# Patient Record
Sex: MALE | Race: WHITE | NOT HISPANIC OR LATINO | Employment: FULL TIME | URBAN - METROPOLITAN AREA
[De-identification: names, ages, dates, MRNs, and addresses within clinical notes are randomized per-mention and may not be internally consistent; named-entity substitution may affect disease eponyms.]

---

## 2022-05-20 ENCOUNTER — OFFICE VISIT (OUTPATIENT)
Dept: NEUROSURGERY | Facility: CLINIC | Age: 57
End: 2022-05-20

## 2022-05-20 VITALS — TEMPERATURE: 98.2 F | WEIGHT: 235.6 LBS | HEIGHT: 71 IN | BODY MASS INDEX: 32.98 KG/M2

## 2022-05-20 DIAGNOSIS — M51.36 DDD (DEGENERATIVE DISC DISEASE), LUMBAR: ICD-10-CM

## 2022-05-20 DIAGNOSIS — M47.22 CERVICAL SPONDYLOSIS WITH RADICULOPATHY: Primary | ICD-10-CM

## 2022-05-20 DIAGNOSIS — G56.01 CARPAL TUNNEL SYNDROME OF RIGHT WRIST: ICD-10-CM

## 2022-05-20 PROCEDURE — 99203 OFFICE O/P NEW LOW 30 MIN: CPT | Performed by: NEUROLOGICAL SURGERY

## 2022-05-20 RX ORDER — CETIRIZINE HYDROCHLORIDE 10 MG/1
10 TABLET ORAL DAILY
COMMUNITY

## 2022-05-20 RX ORDER — SIMVASTATIN 10 MG
10 TABLET ORAL NIGHTLY
COMMUNITY

## 2022-05-20 RX ORDER — METHYLPREDNISOLONE 4 MG/1
TABLET ORAL
COMMUNITY
Start: 2022-05-12

## 2022-05-20 NOTE — PROGRESS NOTES
Patient: Kyle Guillermo  : 1965    Primary Care Provider: Schumer, Barry, MD    Requesting Provider: As above        History    Chief Complaint: Numbness in the right thumb and index finger with occasional discomfort of the right shoulder.    History of Present Illness: Mr. Guillermo is a 56-year-old gentleman who works at [employer].  He does a good deal of computer work.  Since around November of last year he has had some numbness in the right thumb and index finger.  He has known carpal tunnel syndrome and has used a wrist splint for a long time.  His symptoms had gone away but returned as above and so he reinstituted the wrist splint.  That has not helped very much.  Occasionally gets a twinge of discomfort in the right shoulder.  He has no neck or radicular pain.  Symptoms are more annoying than bothersome.  Historically his father used a home traction unit.    Review of Systems   Constitutional: Negative for activity change, appetite change, chills, diaphoresis, fatigue, fever and unexpected weight change.   HENT: Negative for congestion, dental problem, drooling, ear discharge, ear pain, facial swelling, hearing loss, mouth sores, nosebleeds, postnasal drip, rhinorrhea, sinus pressure, sinus pain, sneezing, sore throat, tinnitus, trouble swallowing and voice change.    Eyes: Negative for photophobia, pain, discharge, redness, itching and visual disturbance.   Respiratory: Negative for apnea, cough, choking, chest tightness, shortness of breath, wheezing and stridor.    Cardiovascular: Negative for chest pain, palpitations and leg swelling.   Gastrointestinal: Negative for abdominal distention, abdominal pain, anal bleeding, blood in stool, constipation, diarrhea, nausea, rectal pain and vomiting.   Endocrine: Negative for cold intolerance, heat intolerance, polydipsia, polyphagia and polyuria.   Genitourinary: Negative for decreased urine volume, difficulty urinating, dysuria, enuresis,  "flank pain, frequency, genital sores, hematuria, penile discharge, penile pain, penile swelling, scrotal swelling, testicular pain and urgency.   Musculoskeletal: Negative for arthralgias, back pain, gait problem, joint swelling, myalgias, neck pain and neck stiffness.   Skin: Negative for color change, pallor, rash and wound.   Allergic/Immunologic: Negative for environmental allergies, food allergies and immunocompromised state.   Neurological: Positive for numbness. Negative for dizziness, tremors, seizures, syncope, facial asymmetry, speech difficulty, weakness, light-headedness and headaches.   Hematological: Negative for adenopathy. Does not bruise/bleed easily.   Psychiatric/Behavioral: Negative for agitation, behavioral problems, confusion, decreased concentration, dysphoric mood, hallucinations, self-injury, sleep disturbance and suicidal ideas. The patient is not nervous/anxious and is not hyperactive.        The patient's past medical history, past surgical history, family history, and social history have been reviewed at length in the electronic medical record.      Physical Exam:   Temp 98.2 °F (36.8 °C)   Ht 180.3 cm (71\")   Wt 107 kg (235 lb 9.6 oz)   BMI 32.86 kg/m²   CONSTITUTIONAL: Patient is well-nourished, pleasant and appears stated age.  MUSCULOSKELETAL:  Straight leg raising is negative.  Carroll's Sign is negative.  ROM in the low back is normal.  Tenderness in the back to palpation is not observed.  Tinel's sign is positive at the left wrist but negative on the right.  There is no focal tenderness to palpation in the shoulder.  NEUROLOGICAL:  Orientation, memory, attention span, language function, and cognition have been examined and are intact.  Strength is intact in the lower extremities to direct testing.  Muscle tone is normal throughout.  Station and gait are normal.  Sensation is intact to light touch testing throughout.  Deep tendon reflexes are 1+ and symmetrical.  Coordination is " intact.      Medical Decision Making    Data Review:   (All imaging studies were personally reviewed unless stated otherwise)  MRI of the cervical spine dated 5/12/2022 demonstrates some mild degenerative disc disease and spondylosis.  This is most pronounced at C5-6.  There is moderate canal narrowing due to broad-based disc-osteophyte.  The radiologist thought there was some signal change in the cord on some of the sequences are not entirely convinced of that.    Electrodiagnostic studies performed by Dr. Santiago demonstrate mild right carpal tunnel syndrome and a mild chronic right C5/6 radiculopathy.    Diagnosis:   1.  Low-grade cervical radiculopathy.  2.  Right carpal tunnel syndrome.    Treatment Options:   The patient was prescribed a short course of steroids by Dr. Santiago.  He is going to go ahead and take that.  I have asked that he utilize his wrist splint.  I have referred him for a session or 2 of physical therapy and then have asked that they provide him with a home traction unit.  His symptoms are very modest.  I see no role for surgical intervention unless he develops more hand symptoms or radicular arm pain.  I do not see evidence of a myelopathy on examination.       Diagnosis Plan   1. Cervical spondylosis with radiculopathy     2. DDD (degenerative disc disease), lumbar     3. Carpal tunnel syndrome of right wrist         Scribed for Manav Espinosa MD by Shy Velazquez MOI 5/20/2022 15:27 EDT      I, Dr. Espinosa, personally performed the services described in the documentation, as scribed in my presence, and it is both accurate and complete.

## 2022-07-11 ENCOUNTER — TREATMENT (OUTPATIENT)
Dept: PHYSICAL THERAPY | Facility: CLINIC | Age: 57
End: 2022-07-11

## 2022-07-11 DIAGNOSIS — R20.2 PARESTHESIA OF RIGHT ARM: Primary | ICD-10-CM

## 2022-07-11 PROCEDURE — 97162 PT EVAL MOD COMPLEX 30 MIN: CPT | Performed by: PHYSICAL THERAPIST

## 2022-07-11 PROCEDURE — 97012 MECHANICAL TRACTION THERAPY: CPT | Performed by: PHYSICAL THERAPIST

## 2022-07-11 PROCEDURE — 97110 THERAPEUTIC EXERCISES: CPT | Performed by: PHYSICAL THERAPIST

## 2022-07-11 NOTE — PROGRESS NOTES
Physical Therapy Initial Evaluation and Plan of Care      Patient: Kyle Guillermo   : 1965  Diagnosis/ICD-10 Code:  The encounter diagnosis was Paresthesia of right arm.   Referring practitioner: Manav Espinosa MD  Date of Initial Visit: Type: THERAPY  Noted: 2022  Today's Date: 2022  Patient seen for 1 sessions         Visit Diagnoses:    ICD-10-CM ICD-9-CM   1. Paresthesia of right arm  R20.2 782.0       Subjective Questionnaire: NDI: 6%    Subjective Evaluation    History of Present Illness  Onset date: 2021.  Mechanism of injury: Pt reports R thumb and forefinger numbness/tingling which is constant.  He has intermittent discomfort in R shoulder and upper arm as well as in R forearm.  He has history of CTS in the past which resolved.  He does not notice weakness in the arm.      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  Progression: no change    Hand dominance: right    Diagnostic Tests  MRI studies: abnormal           Treatment  Exercise 1  Exercise Name 1: Initial HEP and postural education provided.  Addressed pt's questions regarding gym exercises.         Traction 36416  Traction Type: Cervical (trial of cervical traction per MD request)  PT Traction Rx Minutes: 15  Weight: 20  Hold: 60  Progression: 1  Regression: 1  Other Treatment Provided  Traction Type: Cervical (trial of cervical traction per MD request)  Weight: 20  Hold: 60  Progression: 1  Regression: 1   Objective          Postural Observations  Seated posture: fair  Standing posture: fair  Correction of posture: has no consistent effect        Neurological Testing     Sensation   Cervical/Thoracic     Right   Diminished: light touch    Reflexes   Left   Biceps (C5/C6): absent (0)  Brachioradialis (C6): absent (0)  Triceps  (C7): absent (0)    Right   Biceps (C5/C6): absent (0)  Brachioradialis (C6): absent (0)  Triceps (C7): absent (0)    Active Range of Motion   Cervical/Thoracic Spine   Cervical    Flexion: 45 degrees   Extension: 62 degrees   Left lateral flexion: 40 degrees   Right lateral flexion: 42 degrees   Left rotation: 77 degrees   Right rotation: 82 degrees     Additional Active Range of Motion Details  Pt notices no change in R UE symptoms with any CROM.    Strength/Myotome Testing   Cervical Spine     Left   Normal strength    Right   Normal strength    Left Wrist/Hand      (2nd hand position)     Trial 1: 95 lbs    Right Wrist/Hand      (2nd hand position)     Trial 1: 100 lbs    Tests   Cervical     Left   Negative active compression ([place]), cervical distraction and Spurling's sign.     Right   Positive ULTT1 and ULTT3.   Negative active compression ([place]), cervical distraction, Spurling's sign and ULTT4.       Pertinent diagnostic history from Dr. Espinosa's note:  1)MRI of the cervical spine dated 5/12/2022 demonstrates some mild degenerative disc disease and spondylosis.  This is most pronounced at C5-6.  There is moderate canal narrowing due to broad-based disc-osteophyte.      2)Electrodiagnostic studies performed by Dr. Santiago demonstrate mild right carpal tunnel syndrome and a mild chronic right C5/6 radiculopathy.    Assessment & Plan     Assessment  Impairments: abnormal or restricted ROM and lacks appropriate home exercise program  Impairments comments: altered R UE sensation    Assessment details: Pt has longstanding sensory alteration in R thumb and forefinger as well as intermittent neural tension symptoms in R UE.  He has no measurable weakness in arms or hands.  He may benefit from PT intervention to address flexibility, neural tension, and posture.  Symptoms were neither relieved or exacerbated (with exception of neural tension) during clinical exam today.  Prognosis: fair  Prognosis details:  Longstanding nature of symptoms may delay or preclude improvement.    Goals  Plan Goals: 4 weeks  1) Pt demonstrates independence and compliance with initial HEP.  2) Pt demonstrates understanding of correct posture and importance of regular stretch breaks, particularly while working.    Plan  Therapy options: will be seen for skilled therapy services  Planned modality interventions: traction  Planned therapy interventions: manual therapy, abdominal trunk stabilization, body mechanics training, flexibility, functional ROM exercises, home exercise program, joint mobilization, neuromuscular re-education, postural training, soft tissue mobilization, stretching, strengthening and therapeutic activities  Frequency: 1x week  Duration in visits: 4  Treatment plan discussed with: patient  Plan details: PT weekly for 4 visits per POC.        Timed:  Manual Therapy:         mins  25810;  Therapeutic Exercise:    10     mins  59023;     Neuromuscular Shruthi:        mins  95325;    Therapeutic Activity:          mins  29150;     Gait Training:           mins  87696;     Ultrasound:          mins  60169;    Electrical Stimulation:         mins  21753 ( );  Iontophoresis  ___ mins   37251    Untimed:  Electrical Stimulation:         mins  83011 ( );  Mechanical Traction:    15     mins  43839;     Timed Treatment:   25   mins   Total Treatment:     60   mins    PT SIGNATURE: Yesica Gonzalez PT   Electronically signed  DATE TREATMENT INITIATED: 7/11/2022    Initial Certification  Certification Period: 7/11/20229047mikc91/8/2022  I certify that the therapy services are furnished while this patient is under my care.  The services outlined above are required by this patient, and will be reviewed every 90 days.    Physician Signature:_____________________________________________             PHYSICIAN: Manav Espinosa MD  NPI: [id number]                                      DATE:       Please sign and return via fax to  [phone].. Thank you, [facility].

## 2022-08-04 ENCOUNTER — TREATMENT (OUTPATIENT)
Dept: PHYSICAL THERAPY | Facility: CLINIC | Age: 57
End: 2022-08-04

## 2022-08-04 DIAGNOSIS — R20.2 PARESTHESIA OF RIGHT ARM: Primary | ICD-10-CM

## 2022-08-04 PROCEDURE — 97110 THERAPEUTIC EXERCISES: CPT | Performed by: PHYSICAL THERAPIST

## 2025-02-25 NOTE — PROGRESS NOTES
Physical Therapy Treatment Note/Discharge Note  VISIT: 2          Subjective    Kyle Guillermo reports: no change in symptoms.  He has been doing prescribed exercises.          Objective    Treatment    Exercise 1  Exercise Name 1: Review of current symptoms, current HEP.  Exercise 2  Exercise Name 2: progression of HEP to include advanced UE nerve glides                 Assessment & Plan     Assessment    Assessment details: Pt reports no change in symptoms since initial evaluation.  He stretches regularly and does not feel that his symptoms impair his functional abilities.    Goals  Plan Goals: Plan Goals: 4 weeks  1) Pt demonstrates independence and compliance with initial HEP-MET.  2) Pt demonstrates understanding of correct posture and importance of regular stretch breaks, particularly while working-MET.    Plan  Plan details: DC PT.  Pt will continue with independent exercise.  He understands that he should return to MD for worsening symptoms.                 Timed:  Manual Therapy:         mins  71555;  Therapeutic Exercise:    25     mins  01390;     Neuromuscular Shruthi:        mins  52207;    Therapeutic Activity:          mins  58875;     Gait Training:           mins  42227;     Ultrasound:          mins  51088;    Electrical Stimulation:         mins  05021 ( );    Untimed:  Electrical Stimulation:         mins  59922 ( );  Mechanical Traction:         mins  49749;  Canalith Repositioning       mins  52305    Timed Treatment:   25   mins   Total Treatment:     25   mins      Yesica Gonzalez, PT  Physical Therapist                     Awake/Alert